# Patient Record
Sex: FEMALE | Race: WHITE | URBAN - METROPOLITAN AREA
[De-identification: names, ages, dates, MRNs, and addresses within clinical notes are randomized per-mention and may not be internally consistent; named-entity substitution may affect disease eponyms.]

---

## 2019-12-10 LAB
B-HCG FREE SERPL-ACNC: <1 MIU/ML
BUN SERPL-MCNC: 12 MG/DL (ref 7–18)
CALCIUM SERPL-MCNC: 9 MG/DL (ref 8.5–10.1)
CHLORIDE SERPL-SCNC: 109 MMOL/L (ref 98–107)
CO2 SERPL-SCNC: 27 MMOL/L (ref 21–32)
CREAT SERPL-MCNC: 0.62 MG/DL (ref 0.55–1.02)
CREAT/UREA NIT SERPL: 19.4
ERYTHROCYTE [DISTWIDTH] IN BLOOD BY AUTOMATED COUNT: 11.8 % (ref 11.5–17)
GLUCOSE SERPL-MCNC: 87 MG/DL (ref 74–106)
GROUP & RH: NORMAL
HCT VFR BLD AUTO: 42.9 % (ref 37–47)
HGB BLD-MCNC: 13.7 GM/DL (ref 12–16)
MCH RBC QN AUTO: 30.6 PG (ref 27–31)
MCHC RBC AUTO-ENTMCNC: 31.9 GM/DL (ref 33–36)
MCV RBC AUTO: 95.8 FL (ref 80–94)
PLATELET # BLD AUTO: 243 X10(3)/MCL (ref 130–400)
PMV BLD AUTO: 9.1 FL (ref 9.4–12.4)
POTASSIUM SERPL-SCNC: 4.2 MMOL/L (ref 3.5–5.1)
RBC # BLD AUTO: 4.48 X10(6)/MCL (ref 4.2–5.4)
SODIUM SERPL-SCNC: 142 MMOL/L (ref 136–145)
WBC # SPEC AUTO: 6.2 X10(3)/MCL (ref 4.5–11.5)

## 2019-12-12 ENCOUNTER — HISTORICAL (OUTPATIENT)
Dept: ADMINISTRATIVE | Facility: HOSPITAL | Age: 24
End: 2019-12-12

## 2019-12-12 LAB
B-HCG FREE SERPL-ACNC: <1 MIU/ML
B-HCG FREE SERPL-ACNC: <1 MIU/ML

## 2022-04-10 ENCOUNTER — HISTORICAL (OUTPATIENT)
Dept: ADMINISTRATIVE | Facility: HOSPITAL | Age: 27
End: 2022-04-10

## 2022-04-30 VITALS
WEIGHT: 150.25 LBS | BODY MASS INDEX: 23.58 KG/M2 | SYSTOLIC BLOOD PRESSURE: 120 MMHG | HEIGHT: 67 IN | DIASTOLIC BLOOD PRESSURE: 68 MMHG

## 2022-04-30 NOTE — OP NOTE
Patient:   Pia Zaragoza            MRN: [id number]            FIN: [id number]               Age:   24 years     Sex:  Female     :  1995   Associated Diagnoses:   None   Author:   Tiffanie VALLE, Merritt GARCIA      Operative Note   Preoperative diagnosis:    1. pelvic pain  2. suspected endometriosis        Postoperative diagnosis:      1. mild (stage I) endometriosis  2. intestinal adhesions    Surgeon:   Dr. Moreno    Operation(s):    1. robot-assisted laparoscopic lysis of adhesions  2. robot assisted excision of endometriosis  3. robot assisted ablation of endometriosis      Anesthesia: General endotracheal anesthesia    Findings:  Diagnostic laparoscopy revealed adhesions involving the sigmoid colon and left abdominal wall.  Additionally there were  endometriosis lesions involving 2 areas of the left posterior cul-de-sac, and several superficial lesions along the left ovarian surface.    Specimens:  1. left posterior cul-de-sac endometriosis #1  2. left posterior cul-de-sac endometriosis #2        Procedure:    After informed consent and negative hCG was verified patient was taken to the operating room with IV fluid running. She was then administered general endotracheal anesthesia, and prepped and draped in low lithotomy position using stirrups. The patient's arms were tucked at her sides. A Smith catheter was placed into the bladder. The 8cm SHAHID tip was assembled which was then placed into the uterine cavity with the assistance of a weighted speculum and tenaculum. Attention was then turned towards the abdomen where the base of the umbilicus was anesthetized with half percent Marcaine with epinephrine.   A 8 mm vertical skin incision was made with scalpel at the base of the umbilicus.  A Varese needle and placed in the intraperitoneal cavity. Intraperitoneal placement confirmed by instillation of normal saline and ball test.  The peritoneum was then obtained with CO2 opening pressure that was noted to  be 7 mmHg. Pressure was initially set at 20 mm of mercury until all 3 trocars were placed. After placement of all trocars the pressure was reduced to 12mmHg for the remainder of the case.  A robotic trocar was placed in the right lower quadrant through an anesthetized 8 mm skin incision at a point approximately  8 cm lateral in a 10° caudad direction from the umbilical incision.  An additional accessory trocar was then placed in the left lower quadrant immediately symmetrical to the right lower quadrant port also an anesthetized 8 mm skin incision under laparoscopic visualization.    At this point the patient was placed in steep Trendelenburg. The bowel was swept out of the posterior cul-de-sac to the degree possible with adhesions present. Next Trendelenburg was reduced to approximately 20°. At this time the robot was docked in the following fashion. The 8.5 mm camera was docked at the umbilical port. The right lower quadrant port was assembled with the monopolar scissors using the arm #1. The left lower quadrant port degrees was assembled with the  bipolar forceps using arm #3. At this point the surgeon broke scrub and was seated at the console. Diagnostic laparoscopy was then performed with findings as described above.    Lysis of the intestinal adhesions was performed using monopolar scissors.   Next the suspected areas of endometriosis were excised with a combination of both sharp cutting and electrocautery with cut current. Care was taken to observe the course of both ureters prior and during peritoneal resections.  Several areas of superficial endometriosis on the surface of the left ovary were ablated with electrocautery cut current.      At this point the robot was undocked and the surgeon re-scrubbed.    Next, a Seprafilm solution was applied through the laparoscope After hemostasis was confirmed the pelvis was irrigated with warm lactated Ringer's solution.   The patient was taken out of Trendelenburg.  Pelvis was again inspected and noted to be hemostatic. Approximately 900mL of warm lactated Ringer's was left in the pelvic cavity the close of the case to both minimize postoperative adhesions and pain. Pneumoperitoneum was then evacuated. All trocars were removed from the abdomen. The skin incisions for all 3 trocar sites were then closed with 4-0 Monocryl in subcuticular fashion. The skin incisions were then sealed with Dermabond. The SHAHID was then removed from the vagina, and the Smith catheter catheter was left in place of the case. Instrument sponge and needle counts were correct x2. The patient was extubated and returned to the recovery room awake and in stable condition.    Complications: None    EBL: 10 ml  UOP: 150ml  IV: 500ml